# Patient Record
Sex: FEMALE | Race: WHITE
[De-identification: names, ages, dates, MRNs, and addresses within clinical notes are randomized per-mention and may not be internally consistent; named-entity substitution may affect disease eponyms.]

---

## 2017-02-06 ENCOUNTER — RESULT REVIEW (OUTPATIENT)
Age: 47
End: 2017-02-06

## 2017-02-07 ENCOUNTER — LABORATORY RESULT (OUTPATIENT)
Age: 47
End: 2017-02-07

## 2017-02-07 ENCOUNTER — APPOINTMENT (OUTPATIENT)
Dept: SURGERY | Facility: CLINIC | Age: 47
End: 2017-02-07

## 2017-02-07 VITALS
BODY MASS INDEX: 25.8 KG/M2 | HEIGHT: 64.5 IN | DIASTOLIC BLOOD PRESSURE: 65 MMHG | WEIGHT: 153 LBS | HEART RATE: 56 BPM | SYSTOLIC BLOOD PRESSURE: 95 MMHG

## 2017-02-07 DIAGNOSIS — F17.210 NICOTINE DEPENDENCE, CIGARETTES, UNCOMPLICATED: ICD-10-CM

## 2017-02-07 DIAGNOSIS — Z83.2 FAMILY HISTORY OF DISEASES OF THE BLOOD AND BLOOD-FORMING ORGANS AND CERTAIN DISORDERS INVOLVING THE IMMUNE MECHANISM: ICD-10-CM

## 2017-02-07 DIAGNOSIS — Z80.9 FAMILY HISTORY OF MALIGNANT NEOPLASM, UNSPECIFIED: ICD-10-CM

## 2017-02-09 PROBLEM — Z83.2 FAMILY HISTORY OF BLEEDING DISORDER: Status: ACTIVE | Noted: 2017-02-09

## 2017-02-09 PROBLEM — F17.210 CURRENT SOME DAY SMOKER: Status: ACTIVE | Noted: 2017-02-09

## 2017-02-09 PROBLEM — Z80.9 FAMILY HISTORY OF CANCER: Status: ACTIVE | Noted: 2017-02-09

## 2017-02-15 ENCOUNTER — OTHER (OUTPATIENT)
Age: 47
End: 2017-02-15

## 2017-05-09 ENCOUNTER — APPOINTMENT (OUTPATIENT)
Dept: SURGERY | Facility: CLINIC | Age: 47
End: 2017-05-09

## 2017-05-09 DIAGNOSIS — R59.9 ENLARGED LYMPH NODES, UNSPECIFIED: ICD-10-CM

## 2017-11-14 ENCOUNTER — APPOINTMENT (OUTPATIENT)
Dept: SURGERY | Facility: CLINIC | Age: 47
End: 2017-11-14

## 2018-11-29 ENCOUNTER — EMERGENCY (EMERGENCY)
Facility: HOSPITAL | Age: 48
LOS: 0 days | Discharge: HOME | End: 2018-11-30
Attending: EMERGENCY MEDICINE | Admitting: EMERGENCY MEDICINE
Payer: COMMERCIAL

## 2018-11-29 VITALS — WEIGHT: 160.06 LBS

## 2018-11-29 DIAGNOSIS — Z82.49 FAMILY HISTORY OF ISCHEMIC HEART DISEASE AND OTHER DISEASES OF THE CIRCULATORY SYSTEM: ICD-10-CM

## 2018-11-29 DIAGNOSIS — Z88.0 ALLERGY STATUS TO PENICILLIN: ICD-10-CM

## 2018-11-29 DIAGNOSIS — Z91.040 LATEX ALLERGY STATUS: ICD-10-CM

## 2018-11-29 DIAGNOSIS — G44.209 TENSION-TYPE HEADACHE, UNSPECIFIED, NOT INTRACTABLE: ICD-10-CM

## 2018-11-29 DIAGNOSIS — F17.200 NICOTINE DEPENDENCE, UNSPECIFIED, UNCOMPLICATED: ICD-10-CM

## 2018-11-29 DIAGNOSIS — Z86.018 PERSONAL HISTORY OF OTHER BENIGN NEOPLASM: Chronic | ICD-10-CM

## 2018-11-29 DIAGNOSIS — Z80.3 FAMILY HISTORY OF MALIGNANT NEOPLASM OF BREAST: ICD-10-CM

## 2018-11-29 DIAGNOSIS — R50.9 FEVER, UNSPECIFIED: ICD-10-CM

## 2018-11-29 DIAGNOSIS — Z98.89 OTHER SPECIFIED POSTPROCEDURAL STATES: Chronic | ICD-10-CM

## 2018-11-29 DIAGNOSIS — Z80.9 FAMILY HISTORY OF MALIGNANT NEOPLASM, UNSPECIFIED: ICD-10-CM

## 2018-11-29 LAB
ALBUMIN SERPL ELPH-MCNC: 3.4 G/DL — SIGNIFICANT CHANGE UP (ref 3.3–5)
ALP SERPL-CCNC: 91 U/L — SIGNIFICANT CHANGE UP (ref 40–120)
ALT FLD-CCNC: 89 U/L — HIGH (ref 12–78)
ANION GAP SERPL CALC-SCNC: 7 MMOL/L — SIGNIFICANT CHANGE UP (ref 5–17)
APPEARANCE UR: CLEAR — SIGNIFICANT CHANGE UP
AST SERPL-CCNC: 36 U/L — SIGNIFICANT CHANGE UP (ref 15–37)
BASOPHILS # BLD AUTO: 0.02 K/UL — SIGNIFICANT CHANGE UP (ref 0–0.2)
BASOPHILS NFR BLD AUTO: 0.3 % — SIGNIFICANT CHANGE UP (ref 0–2)
BILIRUB SERPL-MCNC: 0.2 MG/DL — SIGNIFICANT CHANGE UP (ref 0.2–1.2)
BILIRUB UR-MCNC: NEGATIVE — SIGNIFICANT CHANGE UP
BUN SERPL-MCNC: 13 MG/DL — SIGNIFICANT CHANGE UP (ref 7–23)
CALCIUM SERPL-MCNC: 8.8 MG/DL — SIGNIFICANT CHANGE UP (ref 8.5–10.1)
CHLORIDE SERPL-SCNC: 103 MMOL/L — SIGNIFICANT CHANGE UP (ref 96–108)
CO2 SERPL-SCNC: 25 MMOL/L — SIGNIFICANT CHANGE UP (ref 22–31)
COLOR SPEC: YELLOW — SIGNIFICANT CHANGE UP
CREAT SERPL-MCNC: 0.72 MG/DL — SIGNIFICANT CHANGE UP (ref 0.5–1.3)
DIFF PNL FLD: NEGATIVE — SIGNIFICANT CHANGE UP
EOSINOPHIL # BLD AUTO: 0.07 K/UL — SIGNIFICANT CHANGE UP (ref 0–0.5)
EOSINOPHIL NFR BLD AUTO: 1 % — SIGNIFICANT CHANGE UP (ref 0–6)
GLUCOSE SERPL-MCNC: 100 MG/DL — HIGH (ref 70–99)
GLUCOSE UR QL: NEGATIVE MG/DL — SIGNIFICANT CHANGE UP
HCT VFR BLD CALC: 33.4 % — LOW (ref 34.5–45)
HGB BLD-MCNC: 11 G/DL — LOW (ref 11.5–15.5)
IMM GRANULOCYTES NFR BLD AUTO: 0.3 % — SIGNIFICANT CHANGE UP (ref 0–1.5)
KETONES UR-MCNC: ABNORMAL
LACTATE SERPL-SCNC: 0.8 MMOL/L — SIGNIFICANT CHANGE UP (ref 0.7–2)
LEUKOCYTE ESTERASE UR-ACNC: NEGATIVE — SIGNIFICANT CHANGE UP
LIDOCAIN IGE QN: 333 U/L — SIGNIFICANT CHANGE UP (ref 73–393)
LYMPHOCYTES # BLD AUTO: 1.96 K/UL — SIGNIFICANT CHANGE UP (ref 1–3.3)
LYMPHOCYTES # BLD AUTO: 26.9 % — SIGNIFICANT CHANGE UP (ref 13–44)
MCHC RBC-ENTMCNC: 28.5 PG — SIGNIFICANT CHANGE UP (ref 27–34)
MCHC RBC-ENTMCNC: 32.9 GM/DL — SIGNIFICANT CHANGE UP (ref 32–36)
MCV RBC AUTO: 86.5 FL — SIGNIFICANT CHANGE UP (ref 80–100)
MONOCYTES # BLD AUTO: 0.32 K/UL — SIGNIFICANT CHANGE UP (ref 0–0.9)
MONOCYTES NFR BLD AUTO: 4.4 % — SIGNIFICANT CHANGE UP (ref 2–14)
NEUTROPHILS # BLD AUTO: 4.89 K/UL — SIGNIFICANT CHANGE UP (ref 1.8–7.4)
NEUTROPHILS NFR BLD AUTO: 67.1 % — SIGNIFICANT CHANGE UP (ref 43–77)
NITRITE UR-MCNC: NEGATIVE — SIGNIFICANT CHANGE UP
NRBC # BLD: 0 /100 WBCS — SIGNIFICANT CHANGE UP (ref 0–0)
PH UR: 8 — SIGNIFICANT CHANGE UP (ref 5–8)
PLATELET # BLD AUTO: 317 K/UL — SIGNIFICANT CHANGE UP (ref 150–400)
POTASSIUM SERPL-MCNC: 4.3 MMOL/L — SIGNIFICANT CHANGE UP (ref 3.5–5.3)
POTASSIUM SERPL-SCNC: 4.3 MMOL/L — SIGNIFICANT CHANGE UP (ref 3.5–5.3)
PROT SERPL-MCNC: 7.1 GM/DL — SIGNIFICANT CHANGE UP (ref 6–8.3)
PROT UR-MCNC: NEGATIVE MG/DL — SIGNIFICANT CHANGE UP
RBC # BLD: 3.86 M/UL — SIGNIFICANT CHANGE UP (ref 3.8–5.2)
RBC # FLD: 13.1 % — SIGNIFICANT CHANGE UP (ref 10.3–14.5)
SODIUM SERPL-SCNC: 135 MMOL/L — SIGNIFICANT CHANGE UP (ref 135–145)
SP GR SPEC: 1.01 — SIGNIFICANT CHANGE UP (ref 1.01–1.02)
UROBILINOGEN FLD QL: NEGATIVE MG/DL — SIGNIFICANT CHANGE UP
WBC # BLD: 7.28 K/UL — SIGNIFICANT CHANGE UP (ref 3.8–10.5)
WBC # FLD AUTO: 7.28 K/UL — SIGNIFICANT CHANGE UP (ref 3.8–10.5)

## 2018-11-29 PROCEDURE — 71046 X-RAY EXAM CHEST 2 VIEWS: CPT | Mod: 26

## 2018-11-29 PROCEDURE — 70450 CT HEAD/BRAIN W/O DYE: CPT | Mod: 26

## 2018-11-29 PROCEDURE — 99285 EMERGENCY DEPT VISIT HI MDM: CPT | Mod: 25

## 2018-11-29 RX ORDER — SODIUM CHLORIDE 9 MG/ML
2200 INJECTION INTRAMUSCULAR; INTRAVENOUS; SUBCUTANEOUS ONCE
Qty: 0 | Refills: 0 | Status: COMPLETED | OUTPATIENT
Start: 2018-11-29 | End: 2018-11-29

## 2018-11-29 RX ORDER — ACETAMINOPHEN 500 MG
1000 TABLET ORAL ONCE
Qty: 0 | Refills: 0 | Status: COMPLETED | OUTPATIENT
Start: 2018-11-29 | End: 2018-11-29

## 2018-11-29 RX ADMIN — Medication 1000 MILLIGRAM(S): at 23:53

## 2018-11-29 RX ADMIN — SODIUM CHLORIDE 4400 MILLILITER(S): 9 INJECTION INTRAMUSCULAR; INTRAVENOUS; SUBCUTANEOUS at 22:59

## 2018-11-29 RX ADMIN — SODIUM CHLORIDE 2200 MILLILITER(S): 9 INJECTION INTRAMUSCULAR; INTRAVENOUS; SUBCUTANEOUS at 23:41

## 2018-11-29 RX ADMIN — Medication 1000 MILLIGRAM(S): at 23:01

## 2018-11-29 NOTE — ED ADULT NURSE NOTE - FAMILY HISTORY
Mother  Still living? Yes, Estimated age: 61-70  Family history of breast cancer in mother, Age at diagnosis: Age Unknown     Father  Still living? No  Family history of heart attack, Age at diagnosis: Age Unknown     Sibling  Still living? Yes, Estimated age: 41-50  Family history of cancer, Age at diagnosis: Age Unknown

## 2018-11-29 NOTE — ED ADULT NURSE NOTE - IN THE PAST YEAR, HOW OFTEN HAVE YOU USED TOBACCO PRODUCTS?
How Severe Is Your Skin Lesion?: moderate Have Your Skin Lesions Been Treated?: not been treated Is This A New Presentation, Or A Follow-Up?: Skin Lesion Never

## 2018-11-29 NOTE — ED ADULT NURSE NOTE - PSH
H/O breast surgery  right breast ; removal of breast ducts 2015  H/O lipoma  excision of multiple lipomas and fibrinomas

## 2018-11-29 NOTE — ED PROVIDER NOTE - CARE PROVIDER_API CALL
Eloisa Carreon), Neurology  General  [address] [place] [address]  Phone: [phone]  Fax: [phone]

## 2018-11-29 NOTE — ED PROVIDER NOTE - MUSCULOSKELETAL, MLM
Spine appears normal, range of motion is not limited, no muscle or joint tenderness. No nuchal rigidity. Jolt negative.

## 2018-11-29 NOTE — ED ADULT NURSE NOTE - OBJECTIVE STATEMENT
Pt came to ed for abdominal pain, neck, and head pain that has became worse over the last couple days. Pt complains of generalized body aches and weakness as well. PT states she developed fever today and decided to come to ED. PT rates pain 8/10. A7ox3, VSS. Denies sob, chest pain, and burning on urination. Pt skin warm and pink. Temp 100.9. Pt w/o distress of any kind. Pt resting safe w/ call bell in reach. Family at bedside.

## 2018-11-29 NOTE — ED PROVIDER NOTE - NSFOLLOWUPINSTRUCTIONS_ED_ALL_ED_FT
Headache    A headache is pain or discomfort felt around the head or neck area. The specific cause of a headache may not be found as there are many types including tension headaches, migraine headaches, and cluster headaches. Watch your condition for any changes. Things you can do to manage your pain include taking over the counter and prescription medications as instructed by your health care provider, lying down in a dark quiet room, limiting stress, getting regular sleep, and refraining from alcohol and tobacco products.    SEEK IMMEDIATE MEDICAL CARE IF YOU HAVE ANY OF THE FOLLOWING SYMPTOMS: fever, vomiting, stiff neck, loss of vision, problems with speech, muscle weakness, loss of balance, trouble walking, passing out, or confusion.      Fever    A fever is an increase in the body's temperature above 100.4°F (38°C) or higher. In adults and children older than three months, a brief mild or moderate fever generally has no long-term effect, and it usually does not require treatment. Many times, fevers are the result of viral infections, which are self-resolving.  However, certain symptoms or diagnostic tests may suggest a bacterial infection that may respond to antibiotics. Take medications as directed by your health care provider.    SEEK IMMEDIATE MEDICAL CARE IF YOU OR YOUR CHILD HAVE ANY OF THE FOLLOWING SYMPTOMS : shortness of breath, seizure, rash/stiff neck/headache, severe abdominal pain, persistent vomiting, any signs of dehydration, or if your child has a fever for over five (5) days.      Use Tylenol 1000mg every 6 hours and Motrin 600 mg every 6 hours as need for fever/pain.

## 2018-11-29 NOTE — ED PROVIDER NOTE - OBJECTIVE STATEMENT
49 y/o female with a PMHx of Cubital tunnel syndrome on left presents to the ED c/o HA x1 week, fever today. Pt states she has had HA and pain in the back of her head, neck, and shoulders for 1 week. Pt saw her PCP 5 days ago and had blood work taken. 5 days ago pt had episode of abd pain, none since. Today, pt had onset of fever, Tmax 101.6 with associated worsened HA, neck pain, sore throat. Took Motrin at 9:00pm. Denies coughing, vomiting, diarrhea, dysuria, and rashes. No h/o flu and has not had flu shot. No recent h/o migraine. +stress recently. No + sick contact. No recent travel. 1/3 PPD smoker. No EtOH. PCP: Dr. Duran

## 2018-11-29 NOTE — ED PROVIDER NOTE - PROGRESS NOTE DETAILS
Attending Christensen, review with pt and family results of tests.  pt feeling better.  plan d.c and follow up with PMD.

## 2018-11-29 NOTE — ED PROVIDER NOTE - ENMT, MLM
Airway patent, Nasal mucosa clear. Mouth with normal mucosa. Throat has no vesicles, no oropharyngeal exudates and uvula is midline. No throat redness.

## 2018-11-30 VITALS
OXYGEN SATURATION: 97 % | DIASTOLIC BLOOD PRESSURE: 58 MMHG | RESPIRATION RATE: 18 BRPM | SYSTOLIC BLOOD PRESSURE: 107 MMHG | HEART RATE: 80 BPM | TEMPERATURE: 98 F

## 2018-11-30 LAB — RAPID RVP RESULT: SIGNIFICANT CHANGE UP

## 2018-11-30 RX ORDER — SODIUM CHLORIDE 9 MG/ML
1000 INJECTION, SOLUTION INTRAVENOUS
Qty: 0 | Refills: 0 | Status: DISCONTINUED | OUTPATIENT
Start: 2018-11-30 | End: 2018-11-30

## 2018-11-30 RX ORDER — KETOROLAC TROMETHAMINE 30 MG/ML
30 SYRINGE (ML) INJECTION ONCE
Qty: 0 | Refills: 0 | Status: DISCONTINUED | OUTPATIENT
Start: 2018-11-30 | End: 2018-11-30

## 2018-11-30 RX ADMIN — Medication 30 MILLIGRAM(S): at 01:02

## 2018-11-30 NOTE — ED POST DISCHARGE NOTE - DETAILS
Left message on voicemail to return call to [phone]. - Claudio Bey PA-C Patient returned call, advised to come to ED for CT scan. - Claudio Bey PA-C

## 2018-11-30 NOTE — ED POST DISCHARGE NOTE - RESULT SUMMARY
Received call from radiology regarding abnormal CXR finding. Advised patient to return for CT chest. - Claudio Bey PA-C

## 2018-12-01 LAB
CULTURE RESULTS: SIGNIFICANT CHANGE UP
SPECIMEN SOURCE: SIGNIFICANT CHANGE UP

## 2018-12-03 ENCOUNTER — TRANSCRIPTION ENCOUNTER (OUTPATIENT)
Age: 48
End: 2018-12-03

## 2018-12-05 LAB
CULTURE RESULTS: SIGNIFICANT CHANGE UP
CULTURE RESULTS: SIGNIFICANT CHANGE UP
SPECIMEN SOURCE: SIGNIFICANT CHANGE UP
SPECIMEN SOURCE: SIGNIFICANT CHANGE UP

## 2018-12-20 ENCOUNTER — APPOINTMENT (OUTPATIENT)
Dept: NEUROLOGY | Facility: CLINIC | Age: 48
End: 2018-12-20

## 2020-06-12 ENCOUNTER — LABORATORY RESULT (OUTPATIENT)
Age: 50
End: 2020-06-12

## 2020-06-12 ENCOUNTER — APPOINTMENT (OUTPATIENT)
Dept: CARDIOLOGY | Facility: CLINIC | Age: 50
End: 2020-06-12
Payer: COMMERCIAL

## 2020-06-12 ENCOUNTER — NON-APPOINTMENT (OUTPATIENT)
Age: 50
End: 2020-06-12

## 2020-06-12 VITALS
WEIGHT: 165 LBS | SYSTOLIC BLOOD PRESSURE: 120 MMHG | DIASTOLIC BLOOD PRESSURE: 76 MMHG | HEIGHT: 64.5 IN | OXYGEN SATURATION: 98 % | HEART RATE: 55 BPM | BODY MASS INDEX: 27.83 KG/M2

## 2020-06-12 DIAGNOSIS — R94.31 ABNORMAL ELECTROCARDIOGRAM [ECG] [EKG]: ICD-10-CM

## 2020-06-12 DIAGNOSIS — Z00.00 ENCOUNTER FOR GENERAL ADULT MEDICAL EXAMINATION W/OUT ABNORMAL FINDINGS: ICD-10-CM

## 2020-06-12 PROCEDURE — 99204 OFFICE O/P NEW MOD 45 MIN: CPT

## 2020-06-12 PROCEDURE — 93000 ELECTROCARDIOGRAM COMPLETE: CPT

## 2020-06-12 RX ORDER — MULTIVIT-MIN/FOLIC/VIT K/LYCOP 400-300MCG
50 MCG TABLET ORAL
Refills: 0 | Status: ACTIVE | COMMUNITY

## 2020-06-12 RX ORDER — LEVOTHYROXINE SODIUM 0.03 MG/1
25 TABLET ORAL
Refills: 0 | Status: ACTIVE | COMMUNITY

## 2020-06-12 RX ORDER — UMECLIDINIUM BROMIDE AND VILANTEROL TRIFENATATE 62.5; 25 UG/1; UG/1
62.5-25 POWDER RESPIRATORY (INHALATION) DAILY
Refills: 1 | Status: ACTIVE | COMMUNITY
Start: 2020-06-12

## 2020-06-12 RX ORDER — ASPIRIN ENTERIC COATED TABLETS 81 MG 81 MG/1
81 TABLET, DELAYED RELEASE ORAL
Refills: 6 | Status: ACTIVE | COMMUNITY

## 2020-06-12 RX ORDER — MULTIVITAMIN
TABLET,CHEWABLE ORAL
Refills: 0 | Status: ACTIVE | COMMUNITY

## 2020-06-12 RX ORDER — LEVOTHYROXINE SODIUM 0.1 MG/1
100 TABLET ORAL
Refills: 0 | Status: ACTIVE | COMMUNITY

## 2020-06-15 LAB
ALBUMIN SERPL ELPH-MCNC: 4.7 G/DL
ALP BLD-CCNC: 54 U/L
ALT SERPL-CCNC: 12 U/L
ANION GAP SERPL CALC-SCNC: 11 MMOL/L
AST SERPL-CCNC: 15 U/L
BASOPHILS # BLD AUTO: 0.04 K/UL
BASOPHILS NFR BLD AUTO: 0.9 %
BILIRUB SERPL-MCNC: 0.3 MG/DL
BUN SERPL-MCNC: 13 MG/DL
CALCIUM SERPL-MCNC: 10.2 MG/DL
CHLORIDE SERPL-SCNC: 106 MMOL/L
CHOLEST SERPL-MCNC: 158 MG/DL
CHOLEST/HDLC SERPL: 2.5 RATIO
CO2 SERPL-SCNC: 26 MMOL/L
CREAT SERPL-MCNC: 0.72 MG/DL
EOSINOPHIL # BLD AUTO: 0.13 K/UL
EOSINOPHIL NFR BLD AUTO: 2.9 %
GLUCOSE SERPL-MCNC: 91 MG/DL
HCT VFR BLD CALC: 38 %
HDLC SERPL-MCNC: 62 MG/DL
HGB BLD-MCNC: 12.3 G/DL
IMM GRANULOCYTES NFR BLD AUTO: 0.2 %
LDLC SERPL CALC-MCNC: 85 MG/DL
LYMPHOCYTES # BLD AUTO: 1.2 K/UL
LYMPHOCYTES NFR BLD AUTO: 26.6 %
MAN DIFF?: NORMAL
MCHC RBC-ENTMCNC: 28.5 PG
MCHC RBC-ENTMCNC: 32.4 GM/DL
MCV RBC AUTO: 88.2 FL
MONOCYTES # BLD AUTO: 0.34 K/UL
MONOCYTES NFR BLD AUTO: 7.5 %
NEUTROPHILS # BLD AUTO: 2.79 K/UL
NEUTROPHILS NFR BLD AUTO: 61.9 %
PLATELET # BLD AUTO: 187 K/UL
POTASSIUM SERPL-SCNC: 4.7 MMOL/L
PROT SERPL-MCNC: 6.8 G/DL
RBC # BLD: 4.31 M/UL
RBC # FLD: 13.8 %
SODIUM SERPL-SCNC: 143 MMOL/L
T3RU NFR SERPL: 0.9 TBI
T4 SERPL-MCNC: 9.8 UG/DL
TRIGL SERPL-MCNC: 57 MG/DL
TSH SERPL-ACNC: 0.51 UIU/ML
WBC # FLD AUTO: 4.51 K/UL

## 2020-07-06 PROBLEM — Z00.00 ENCOUNTER FOR PREVENTIVE HEALTH EXAMINATION: Status: ACTIVE | Noted: 2017-01-31

## 2020-07-06 PROBLEM — R94.31 ABNORMAL EKG: Status: ACTIVE | Noted: 2020-07-06

## 2020-07-06 NOTE — PHYSICAL EXAM
[General Appearance - Well Developed] : well developed [Normal Appearance] : normal appearance [Well Groomed] : well groomed [General Appearance - Well Nourished] : well nourished [General Appearance - In No Acute Distress] : no acute distress [No Deformities] : no deformities [Normal Conjunctiva] : the conjunctiva exhibited no abnormalities [Eyelids - No Xanthelasma] : the eyelids demonstrated no xanthelasmas [Normal Oral Mucosa] : normal oral mucosa [No Oral Cyanosis] : no oral cyanosis [No Oral Pallor] : no oral pallor [Normal Jugular Venous A Waves Present] : normal jugular venous A waves present [Normal Jugular Venous V Waves Present] : normal jugular venous V waves present [No Jugular Venous Fisher A Waves] : no jugular venous fisher A waves [Respiration, Rhythm And Depth] : normal respiratory rhythm and effort [Exaggerated Use Of Accessory Muscles For Inspiration] : no accessory muscle use [Auscultation Breath Sounds / Voice Sounds] : lungs were clear to auscultation bilaterally [Heart Rate And Rhythm] : heart rate and rhythm were normal [Heart Sounds] : normal S1 and S2 [Murmurs] : no murmurs present [Abdomen Tenderness] : non-tender [Abdomen Soft] : soft [Abdomen Mass (___ Cm)] : no abdominal mass palpated [Abnormal Walk] : normal gait [Gait - Sufficient For Exercise Testing] : the gait was sufficient for exercise testing [Cyanosis, Localized] : no localized cyanosis [Nail Clubbing] : no clubbing of the fingernails [Petechial Hemorrhages (___cm)] : no petechial hemorrhages [Skin Color & Pigmentation] : normal skin color and pigmentation [] : no rash [No Venous Stasis] : no venous stasis [Skin Lesions] : no skin lesions [No Skin Ulcers] : no skin ulcer [No Xanthoma] : no  xanthoma was observed [Oriented To Time, Place, And Person] : oriented to person, place, and time [Affect] : the affect was normal [Mood] : the mood was normal [No Anxiety] : not feeling anxious

## 2020-07-06 NOTE — HISTORY OF PRESENT ILLNESS
[FreeTextEntry1] : 50 year old female without past cardiac history presents with abnormal EKG.  Denies any sx of Cp, SOB or H/A.  EKG with NSSTt changes

## 2020-07-06 NOTE — DISCUSSION/SUMMARY
[FreeTextEntry1] : EKG- nonspecific changes and no sx.  No further cardiac workup is indicated\par \par Followup in 6 mths

## 2020-08-11 NOTE — ED PROVIDER NOTE - PROVIDER TOKENS
Island Pedicle Flap Text: The defect edges were debeveled with a #15 scalpel blade.  Given the location of the defect, shape of the defect and the proximity to free margins an island pedicle advancement flap was deemed most appropriate.  Using a sterile surgical marker, an appropriate advancement flap was drawn incorporating the defect, outlining the appropriate donor tissue and placing the expected incisions within the relaxed skin tension lines where possible.    The area thus outlined was incised deep to adipose tissue with a #15 scalpel blade.  The skin margins were undermined to an appropriate distance in all directions around the primary defect and laterally outward around the island pedicle utilizing iris scissors.  There was minimal undermining beneath the pedicle flap. TOKEN:'[id number]'

## 2022-01-19 NOTE — ED PROVIDER NOTE - CARE PLAN
Attending Attestation (For Attendings USE Only)...
Principal Discharge DX:	Intractable headache, unspecified chronicity pattern, unspecified headache type

## 2023-11-07 ENCOUNTER — EMERGENCY (EMERGENCY)
Facility: HOSPITAL | Age: 53
LOS: 0 days | Discharge: HOME | End: 2023-11-07
Attending: EMERGENCY MEDICINE
Payer: COMMERCIAL

## 2023-11-07 VITALS
RESPIRATION RATE: 19 BRPM | OXYGEN SATURATION: 100 % | TEMPERATURE: 98 F | SYSTOLIC BLOOD PRESSURE: 150 MMHG | DIASTOLIC BLOOD PRESSURE: 81 MMHG | HEART RATE: 55 BPM

## 2023-11-07 VITALS — HEIGHT: 64 IN | WEIGHT: 164.91 LBS

## 2023-11-07 DIAGNOSIS — G56.22 LESION OF ULNAR NERVE, LEFT UPPER LIMB: ICD-10-CM
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
## 2023-11-07 DIAGNOSIS — T78.40XA ALLERGY, UNSPECIFIED, INITIAL ENCOUNTER: ICD-10-CM

## 2023-11-07 DIAGNOSIS — Z98.89 OTHER SPECIFIED POSTPROCEDURAL STATES: Chronic | ICD-10-CM

## 2023-11-07 DIAGNOSIS — Z91.048 OTHER NONMEDICINAL SUBSTANCE ALLERGY STATUS: ICD-10-CM

## 2023-11-07 DIAGNOSIS — Z88.0 ALLERGY STATUS TO PENICILLIN: ICD-10-CM

## 2023-11-07 DIAGNOSIS — Z91.041 RADIOGRAPHIC DYE ALLERGY STATUS: ICD-10-CM
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
## 2023-11-07 DIAGNOSIS — J43.9 EMPHYSEMA, UNSPECIFIED: ICD-10-CM

## 2023-11-07 DIAGNOSIS — Z86.018 PERSONAL HISTORY OF OTHER BENIGN NEOPLASM: Chronic | ICD-10-CM

## 2023-11-07 DIAGNOSIS — J44.9 CHRONIC OBSTRUCTIVE PULMONARY DISEASE, UNSPECIFIED: ICD-10-CM

## 2023-11-07 PROCEDURE — 99283 EMERGENCY DEPT VISIT LOW MDM: CPT

## 2023-11-07 RX ADMIN — Medication 1 APPLICATION(S): at 11:56

## 2023-11-07 NOTE — ED STATDOCS - NSICDXFAMILYHX_GEN_ALL_CORE_FT
FAMILY HISTORY:  Father  Still living? No  Family history of heart attack, Age at diagnosis: Age Unknown    Mother  Still living? Yes, Estimated age: 61-70  Family history of breast cancer in mother, Age at diagnosis: Age Unknown    Sibling  Still living? Yes, Estimated age: 41-50  Family history of cancer, Age at diagnosis: Age Unknown

## 2023-11-07 NOTE — ED STATDOCS - CLINICAL SUMMARY MEDICAL DECISION MAKING FREE TEXT BOX
53-year-old female presents the emergency department for allergic reaction.  Patient states that for weeks she has been having redness that started under the left eye and now progressed to under the right eye.  Seen by PMD urgent care is on Benadryl weeks ago was on steroids and now on doxycycline for treatment of preseptal cellulitis.  Now also with some itchiness and rash to her chest wall.  No tongue or uvular swelling exam with clear lungs mild erythema under bilateral eyes no erythema to the sclera no conjunctival injection no proptosis no painful eye movements no signs of anaphylaxis lengthy discussion with patient's shared decision making to start steroids again it turns out significant other has been using creams and body lotions which is possible she is allergic to discussed removing those from the household patient has follow-up with her dermatologist as well

## 2023-11-07 NOTE — ED STATDOCS - PROGRESS NOTE DETAILS
54 y/o female presents to ED for allergic reaction, endorsing redness under B/L eyes, currently being treated with doxy for preseptal cellulitis, now complaining of itchiness. No CP, SOB, n/v, abd pain. VSS on arrival. On exam pt well appearing in NAD, +erythema udner b/l eyes, conjunctiva and sclera clear no injection or discharge, MMM, uvula midline no edema, no lip or tongue swelling, heart RRR lungs CTAB no respiratory distress speaking full sentences. Will rx prednisone and dc home with dermatology follow up. Strict return precautions were given. All questions and concerns were addressed. - Maggie Dobson PA-C

## 2023-11-07 NOTE — ED STATDOCS - PHYSICAL EXAMINATION
Constitutional: NAD   Eyes: PERRLA  Head: Normocephalic   Mouth: MMM, no tongue or uvular swelling  Cardiac: regular rate   Resp: Lungs CTAB  GI: Abd s/nt/nd, no rebound or guarding.  Neuro: awake, alert, moving all extremities  Skin: small fungal rash on R chest wall, mild erythema under b/l eyes

## 2023-11-07 NOTE — ED ADULT TRIAGE NOTE - CHIEF COMPLAINT QUOTE
pt c/o allergic reaction X1 month being treated with medication worsening today. pt states " I felt a little sob earlier but now ok". denies cp/n/v/d/f/chills.  pmh: copd, emphysema. pt o2 in triage 97%, rr 18  able to speak in clear sentences.

## 2023-11-07 NOTE — ED STATDOCS - ATTENDING APP SHARED VISIT CONTRIBUTION OF CARE
I, Tre Leary MD, personally saw the patient with ACP.  I have personally performed a face to face diagnostic evaluation on this patient.  I have reviewed the ACP note and agree with the history, exam, and plan of care, except as noted.   The initial assessment was performed by myself and then the patient was handed off to the ACP. The patient was followed and re-evaluated by the ACP. All labs, imaging and procedures were evaluated and performed by the ACP and I was available for consultation if any questions in the patients care came up.

## 2023-11-07 NOTE — ED ADULT NURSE NOTE - OBJECTIVE STATEMENT
pt c/o allergic reaction X1 month being treated with medication worsening today. pt states " I felt a little sob earlier but now ok". denies cp/n/v/d/f/chills.  pmh: copd, emphysema. pt o2 in triage 97%, rr 18  able to speak in clear sentences. pt brought to RW seen ceci CALLAWAY

## 2023-11-07 NOTE — ED STATDOCS - NSICDXPASTSURGICALHX_GEN_ALL_CORE_FT
PAST SURGICAL HISTORY:  H/O breast surgery right breast ; removal of breast ducts 2015    H/O lipoma excision of multiple lipomas and fibrinomas

## 2023-11-07 NOTE — ED STATDOCS - NSFOLLOWUPINSTRUCTIONS_ED_ALL_ED_FT
Follow up with dermatologist. Take medications as prescribed. Continue doxycycline as prescribed. Return to ED for new or worsening symptoms.    Contact Dermatitis  Dermatitis is redness, soreness, and swelling (inflammation) of the skin. Contact dermatitis is a reaction to certain substances that touch the skin. There are two types of this condition:  Irritant contact dermatitis. This is the most common type. It happens when something irritates your skin, such as when your hands get dry from washing them too often with soap. You can get this type of reaction even if you have not been exposed to the irritant before.  Allergic contact dermatitis. This type is caused by a substance that you are allergic to, such as poison ivy. It occurs when you have been exposed to the substance (allergen) and form a sensitivity to it. In some cases, the reaction may start soon after your first exposure to the allergen. In other cases, it may not start until you are exposed to the allergen again. It may then occur every time you are exposed to the allergen in the future.  What are the causes?  Irritant contact dermatitis is often caused by exposure to:  Makeup.  Soaps, detergents, and bleaches.  Acids.  Metal salts, such as nickel.  Allergic contact dermatitis is often caused by exposure to:  Poisonous plants.  Chemicals.  Jewelry.  Latex.  Medicines.  Preservatives in products, such as clothes.  What increases the risk?  You are more likely to get this condition if you have:  A job that exposes you to irritants or allergens.  Certain medical conditions. These include asthma and eczema.  What are the signs or symptoms?  A person's hands, showing areas of redness and blisters caused by contact dermatitis.  Symptoms of this condition may occur in any place on your body that has been touched by the irritant.  Symptoms include:  Dryness, flaking, or cracking.  Redness.  Itching.  Pain or a burning feeling.  Blisters.  Drainage of small amounts of blood or clear fluid from skin cracks.  With allergic contact dermatitis, there may also be swelling in areas such as the eyelids, mouth, or genitals.    How is this diagnosed?  This condition is diagnosed with a medical history and physical exam.  A patch skin test may be done to help figure out the cause.  If the condition is related to your job, you may need to see an expert in health problems in the workplace (occupational medicine specialist).  How is this treated?  This condition is treated by staying away from the cause of the reaction and protecting your skin from further contact. Treatment may also include:  Steroid creams or ointments. Steroid medicines may need be taken by mouth (orally) in more severe cases.  Antibiotics or medicines applied to the skin to kill bacteria (antibacterial ointments). These may be needed if a skin infection is present.  Antihistamines. These may be taken orally or put on as a lotion to ease itching.  A bandage (dressing).  Follow these instructions at home:  Skin care    Moisturize your skin as needed.  Put cool, wet cloths (cool compresses) on the affected areas.  Try applying baking soda paste to your skin. Stir water into baking soda until it has the consistency of a paste.  Do not scratch your skin. Avoid friction to the affected area.  Avoid the use of soaps, perfumes, and dyes.  Check the affected areas every day for signs of infection. Check for:  More redness, swelling, or pain.  More fluid or blood.  Warmth.  Pus or a bad smell.  Medicines    Take or apply over-the-counter and prescription medicines only as told by your health care provider.  If you were prescribed antibiotics, take or apply them as told by your health care provider. Do not stop using the antibiotic even if you start to feel better.  Bathing    Try taking a bath with:  Epsom salts. Follow the instructions on the packaging. You can get these at your local pharmacy or grocery store.  Baking soda. Pour a small amount into the bath as told by your health care provider.  Colloidal oatmeal. Follow the instructions on the packaging. You can get this at your local pharmacy or grocery store.  Bathe less often. This may mean bathing every other day.  Bathe in lukewarm water. Avoid using hot water.  Bandage care    If you were given a dressing, change it as told by your health care provider.  Wash your hands with soap and water for at least 20 seconds before and after you change your dressing. If soap and water are not available, use hand .  General instructions    Avoid the substance that caused your reaction. If you do not know what caused it, keep a journal to try to track what caused it. Write down:  What you eat and drink.  What cosmetics you use.  What you wear in the affected area. This includes jewelry.  Contact a health care provider if:  Your condition does not get better with treatment.  Your condition gets worse.  You have any signs of infection.  You have a fever.  You have new symptoms.  Your bone or joint under the affected area becomes painful after the skin has healed.  Get help right away if:  You notice red streaks coming from the affected area.  The affected area turns darker.  You have trouble breathing.  This information is not intended to replace advice given to you by your health care provider. Make sure you discuss any questions you have with your health care provider.

## 2023-11-07 NOTE — ED STATDOCS - OBJECTIVE STATEMENT
54 y/o F with PMHx of  copd, emphysema presents to the ED c/o allergic reaction X1 month being treated with medication worsening today. pt states " I felt a little sob earlier but now ok". denies cp/n/v/d/f/chills.  Pt states she feels "jittery" right now. Pt endorses eye swelling and feeling like her lips are swollen. On medication for COPD. Pt took Benadryl with little relief. Allergic to penicillin and iodine.

## 2023-11-07 NOTE — ED STATDOCS - NS_ ATTENDINGSCRIBEDETAILS _ED_A_ED_FT
I, Tre Leary MD,  performed the initial face to face bedside interview with this patient regarding history of present illness, review of symptoms and relevant past medical, social and family history.  I completed an independent physical examination.  I was the initial provider who evaluated this patient.   I personally saw the patient and performed a substantive portion of the visit including all aspects of the medical decision making.  The history, relevant review of systems, past medical and surgical history, medical decision making, and physical examination was documented by the scribe in my presence and I attest to the accuracy of the documentation.

## 2023-11-07 NOTE — ED STATDOCS - PATIENT PORTAL LINK FT
You can access the FollowMyHealth Patient Portal offered by [facility] by registering at the following website: [email or web address]. By joining [facility]’s FollowMyHealth portal, you will also be able to view your health information using other applications (apps) compatible with our system.